# Patient Record
Sex: MALE | Race: WHITE
[De-identification: names, ages, dates, MRNs, and addresses within clinical notes are randomized per-mention and may not be internally consistent; named-entity substitution may affect disease eponyms.]

---

## 2020-04-03 ENCOUNTER — HOSPITAL ENCOUNTER (OUTPATIENT)
Dept: HOSPITAL 56 - MW.ED | Age: 60
LOS: 1 days | Discharge: HOME | End: 2020-04-04
Attending: SURGERY
Payer: SELF-PAY

## 2020-04-03 DIAGNOSIS — F17.210: ICD-10-CM

## 2020-04-03 DIAGNOSIS — K40.31: Primary | ICD-10-CM

## 2020-04-03 LAB
BUN SERPL-MCNC: 15 MG/DL (ref 7–18)
CHLORIDE SERPL-SCNC: 101 MMOL/L (ref 98–107)
CO2 SERPL-SCNC: 25 MMOL/L (ref 21–32)
GLUCOSE SERPL-MCNC: 166 MG/DL (ref 74–106)
LIPASE SERPL-CCNC: 95 U/L (ref 73–393)
POTASSIUM SERPL-SCNC: 3.8 MMOL/L (ref 3.5–5.1)
SODIUM SERPL-SCNC: 139 MMOL/L (ref 136–148)

## 2020-04-03 PROCEDURE — C1781 MESH (IMPLANTABLE): HCPCS

## 2020-04-03 RX ADMIN — HYDROCODONE BITARTRATE AND ACETAMINOPHEN PRN TAB: 5; 325 TABLET ORAL at 20:38

## 2020-04-03 RX ADMIN — HYDROCODONE BITARTRATE AND ACETAMINOPHEN PRN TAB: 5; 325 TABLET ORAL at 15:41

## 2020-04-03 NOTE — PCM.POSTAN
POST ANESTHESIA ASSESSMENT





- MENTAL STATUS


Mental Status: Alert, Oriented





- VITAL SIGNS


Vital Signs: 


 Last Vital Signs











Temp  97.7 F   04/03/20 13:25


 


Pulse  65   04/03/20 13:45


 


Resp  11 L  04/03/20 13:45


 


BP  114/68   04/03/20 13:45


 


Pulse Ox  96   04/03/20 13:45














- RESPIRATORY


Respiratory Status: Respiratory Rate WNL, Airway Patent, O2 Saturation Stable





- CARDIOVASCULAR


CV Status: Pulse Rate WNL, Blood Pressure Stable





- GASTROINTESTINAL


GI Status: No Symptoms





- POST OP HYDRATION


Hydration Status: Adequate & Stable

## 2020-04-03 NOTE — EDM.PDOC
ED HPI GENERAL MEDICAL PROBLEM





- General


Chief Complaint: Abdominal Pain


Stated Complaint: HERNIA


Time Seen by Provider: 04/03/20 08:00


Source of Information: Reports: Patient


History Limitations: Reports: No Limitations





- History of Present Illness


INITIAL COMMENTS - FREE TEXT/NARRATIVE: 





This 59 year old male presents to the ED with a chief complaint of sudden 

severe right inguinal pain that goes into the right mid abdomen since 6:30AM as 

he was on his way to work.  He has a two year history of a right inguinal 

hernia.  He complains of nausea but no vomiting.  He denies any urinary 

symptoms or any other GI symptoms.  He denies any other problems.


Onset: Sudden


Duration: Constant, Getting Worse


Location: Reports: Other (right groin into right scrotal area.)


Quality: Reports: Sharp


Severity: Severe


  ** Right Groin


Pain Score (Numeric/FACES): 10





- Related Data


 Allergies











Allergy/AdvReac Type Severity Reaction Status Date / Time


 


No Known Allergies Allergy   Verified 04/03/20 08:12











Home Meds: 


 Home Meds





. [No Known Home Meds]  04/03/20 [History]











ED ROS GENERAL





- Review of Systems


Review Of Systems: See Below


Constitutional: Reports: No Symptoms


HEENT: Reports: No Symptoms


Respiratory: Reports: No Symptoms


Cardiovascular: Reports: No Symptoms


Endocrine: Reports: No Symptoms


GI/Abdominal: Reports: Abdominal Pain, Nausea, Other (right groin into scrotum)


: Reports: No Symptoms


Musculoskeletal: Reports: No Symptoms


Skin: Reports: No Symptoms


Neurological: Reports: No Symptoms





ED EXAM, GI/ABD





- Physical Exam


Exam: See Below


Exam Limited By: No Limitations


General Appearance: Alert, WD/WN, Moderate Distress (complaining of pain in the 

right groin and right scrotum with associated swelling)


Throat/Mouth: Normal Inspection, Normal Lips, Normal Teeth, Normal Gums, Normal 

Oropharynx, Normal Voice, No Airway Compromise


Neck: Normal Inspection, Supple


Respiratory/Chest: No Respiratory Distress, Lungs Clear, Normal Breath Sounds, 

Chest Non-Tender


Cardiovascular: Normal Peripheral Pulses, Regular Rate, Rhythm, No Edema, No 

Gallop, No JVD, No Murmur


GI/Abdominal Exam: Normal Bowel Sounds, Soft, No Organomegaly, No Distention, 

No Mass, Tender (tenderness in right mid abdomen.), Hernia (right ing hernia 

that appears to be incarcerated extending well into the right scrotal area.  

This hernia is not reducible (incarcerated).)


 (Male) Exam: Circumcised, Scrotum Tenderness (R) (see below), Other (

unreducible hernia is noted in the right scrotal area that elicit severe pain.)

.  No: Penile Lesions


Rectal (Males) Exam: Deferred (at this time)


Back Exam: Normal Inspection


Extremities: Normal Inspection, Normal Range of Motion


Neurological: Alert, Oriented (times 4), CN II-XII Intact, Normal Reflexes


Psychiatric: Normal Affect


Skin Exam: Warm, Dry, Intact, Normal Color, No Rash


Lymphatic: No Adenopathy





Course





- Vital Signs


Text/Narrative:: 





I talked with Dr. Vogel who was about to start surgery at 8:08AM.  I 

informed him of the patient incarcerated right inguinal hernia extending into 

the right scrotum.  He said that he will see the patient as soon as he gets 

finished with his case (in about 1-1.5 hours).





I talked with the Ultrasound Tech at 9:14AM who states that there are loops of 

small bowel seen in the right scrotum and that there is decrease Doppler flow 

to the right testicle.  This might well be compression of the vascular flow due 

to the large inguinal hernia.  I will inform Dr. Vogel of this finding.  His 

CT of the abdomen and pelvis is pending.





Dr. Vogel is in the ED at 9:52AM.  The patient will be going to surgery 

shortly.  The patient agrees with the plan.  The CT scan results are still 

pending.





I asked Dr. Vogel about starting antibiotics.  He stated that he will do so 

in the OR.


Last Recorded V/S: 


 Last Vital Signs











Temp  96.3 F L  04/03/20 08:09


 


Pulse  81   04/03/20 09:00


 


Resp  20   04/03/20 09:00


 


BP  189/90 H  04/03/20 09:00


 


Pulse Ox  98   04/03/20 09:00














- Orders/Labs/Meds


Orders: 


 Active Orders 24 hr











 Category Date Time Status


 


 NPO Now [Nothing per Oral Now Diet] [DIET] Diet  04/03/20 Lunch Active


 


 Abdomen Pelvis w Cont [CT] Stat Exams  04/03/20 08:03 Taken


 


 Scrotal Duplex Ltd [US] Stat Exams  04/03/20 08:23 Taken


 


 Scrotum and Contents [US] Routine Exams  04/03/20 Ordered


 


 LACTIC ACID,WHOLE BLOOD [BG] Stat Lab  04/03/20 09:04 Ordered


 


 UA W/ADAIR RFLX IF INDICATED [URIN] Stat Lab  04/03/20 08:03 Ordered











Labs: 


 Laboratory Tests











  04/03/20 04/03/20 04/03/20 Range/Units





  08:10 08:10 08:10 


 


WBC  9.77    (4.0-11.0)  K/uL


 


RBC  5.30    (4.50-5.90)  M/uL


 


Hgb  17.0    (13.0-17.0)  g/dL


 


Hct  50.6 H    (38.0-50.0)  %


 


MCV  95.5    (80.0-98.0)  fL


 


MCH  32.1 H    (27.0-32.0)  pg


 


MCHC  33.6    (31.0-37.0)  g/dL


 


RDW Std Deviation  47.2    (28.0-62.0)  fl


 


RDW Coeff of Binh  13    (11.0-15.0)  %


 


Plt Count  230    (150-400)  K/uL


 


MPV  11.90    (7.40-12.00)  fL


 


Neut % (Auto)  56.8    (48.0-80.0)  %


 


Lymph % (Auto)  35.0    (16.0-40.0)  %


 


Mono % (Auto)  6.1    (0.0-15.0)  %


 


Eos % (Auto)  1.6    (0.0-7.0)  %


 


Baso % (Auto)  0.5    (0.0-1.5)  %


 


Neut # (Auto)  5.5    (1.4-5.7)  K/uL


 


Lymph # (Auto)  3.4 H    (0.6-2.4)  K/uL


 


Mono # (Auto)  0.6    (0.0-0.8)  K/uL


 


Eos # (Auto)  0.2    (0.0-0.7)  K/uL


 


Baso # (Auto)  0.1    (0.0-0.1)  K/uL


 


Nucleated RBC %  0.0    /100WBC


 


Nucleated RBCs #  0    K/uL


 


INR   0.97   


 


Sodium    139  (136-148)  mmol/L


 


Potassium    3.8  (3.5-5.1)  mmol/L


 


Chloride    101  ()  mmol/L


 


Carbon Dioxide    25.0  (21.0-32.0)  mmol/L


 


BUN    15  (7.0-18.0)  mg/dL


 


Creatinine    1.2  (0.8-1.3)  mg/dL


 


Est Cr Clr Drug Dosing    70.95  mL/min


 


Estimated GFR (MDRD)    > 60.0  ml/min


 


Glucose    166 H  ()  mg/dL


 


Calcium    9.1  (8.5-10.1)  mg/dL


 


Total Bilirubin    0.4  (0.2-1.0)  mg/dL


 


AST    19  (15-37)  IU/L


 


ALT    25  (14-63)  IU/L


 


Alkaline Phosphatase    68  ()  U/L


 


Total Protein    7.8  (6.4-8.2)  g/dL


 


Albumin    4.3  (3.4-5.0)  g/dL


 


Globulin    3.5  (2.6-4.0)  g/dL


 


Albumin/Globulin Ratio    1.2  (0.9-1.6)  


 


Lipase    95  ()  U/L











Meds: 


Medications














Discontinued Medications














Generic Name Dose Route Start Last Admin





  Trade Name Freq  PRN Reason Stop Dose Admin


 


Hydromorphone HCl  1 mg  04/03/20 08:02  04/03/20 08:17





  Dilaudid  IVPUSH  04/03/20 08:03  1 mg





  ONETIME ONE   Administration





     





     





     





     


 


Hydromorphone HCl  0.5 mg  04/03/20 09:02  04/03/20 09:07





  Dilaudid  IVPUSH  04/03/20 09:03  0.5 mg





  ONETIME ONE   Administration





     





     





     





     


 


Sodium Chloride  1,000 mls @ 999 mls/hr  04/03/20 08:04  04/03/20 08:13





  Normal Saline  IV  04/03/20 09:04  999 mls/hr





  .Bolus ONE   Administration





     





     





     





     


 


Iopamidol  100 ml  04/03/20 09:47  04/03/20 09:48





  Isovue Multipack-370 (76%)  IVPUSH  04/03/20 09:48  100 ml





  ONETIME STA   Administration





     





     





     





     


 


Ondansetron HCl  4 mg  04/03/20 08:02  04/03/20 08:15





  Zofran  IVPUSH  04/03/20 08:03  4 mg





  ONETIME ONE   Administration





     





     





     





     














Departure





- Departure


Time of Disposition: 10:08


Disposition: Admitted As Inpatient 64


Condition: Fair


Clinical Impression: 


 Incarcerated right inguinal hernia








- Discharge Information


*PRESCRIPTION DRUG MONITORING PROGRAM REVIEWED*: Yes


*COPY OF PRESCRIPTION DRUG MONITORING REPORT IN PATIENT MAXIMINO: Yes


Referrals: 


PCP,None [Primary Care Provider] - 


Forms:  ED Department Discharge





Sepsis Event Note





- Focused Exam


Vital Signs: 


 Vital Signs











  Temp Pulse Resp BP Pulse Ox


 


 04/03/20 09:00   81  20  189/90 H  98


 


 04/03/20 08:09  96.3 F L  77  26 H  188/70 H  100











Date Exam was Performed: 04/03/20


Time Exam was Performed: 10:04





- My Orders


Last 24 Hours: 


My Active Orders





04/03/20


Scrotum and Contents [US] Routine 





04/03/20 08:03


Abdomen Pelvis w Cont [CT] Stat 


UA W/ADAIR RFLX IF INDICATED [URIN] Stat 





04/03/20 08:23


Scrotal Duplex Ltd [US] Stat 





04/03/20 09:04


LACTIC ACID,WHOLE BLOOD [BG] Stat 





04/03/20 Lunch


NPO Now [Nothing per Oral Now Diet] [DIET] 














- Assessment/Plan


Last 24 Hours: 


My Active Orders





04/03/20


Scrotum and Contents [US] Routine 





04/03/20 08:03


Abdomen Pelvis w Cont [CT] Stat 


UA W/ADAIR RFLX IF INDICATED [URIN] Stat 





04/03/20 08:23


Scrotal Duplex Ltd [US] Stat 





04/03/20 09:04


LACTIC ACID,WHOLE BLOOD [BG] Stat 





04/03/20 Lunch


NPO Now [Nothing per Oral Now Diet] [DIET]
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
## 2020-04-03 NOTE — CR
Chest: Portable view of the chest was obtained.

 

Comparison: No prior chest imaging is available.

 

Slight rib deformity is seen within the right upper chest compatible 

with old healed fractures.  Heart size and mediastinum are normal.  

Lungs show no acute parenchymal change.

 

Impression:

1.  Old bony trauma.

2.  Nothing acute is appreciated on portable chest x-ray.

 

Diagnostic code #2

 

Study was dictated in MDT

## 2020-04-03 NOTE — OR
SURGEON:

Oscar Vogel M.D.

 

DATE OF PROCEDURE:  04/03/2020

 

OPERATION PERFORMED:

Repair of recurrent incarcerated right inguinal hernia with small Bard PerFix

plug-and-patch.

 

PRIMARY SURGEON:

Oscar Vogel MD

 

ASSISTANT:

 Assist:  Lynda Worrell.

 

ANESTHESIA:

Spinal.

 

ASA CLASSIFICATION:

IIE.

 

PREOPERATIVE DIAGNOSIS:

Recurrent incarcerated right inguinal hernia.

 

POSTOPERATIVE DIAGNOSIS:

Recurrent incarcerated right inguinal hernia.

 

ESTIMATED BLOOD LOSS:

50 mL.

 

INTRAOPERATIVE FLUID REPLACEMENT:

1200 mL of crystalloid.

 

DESCRIPTION OF PROCEDURE:

The patient was taken to the operating room and placed on the operating table in

the supine position.  Time-out was called for appropriate identification of the

patient and procedure.  Thigh-high TEDs and sequential compression boots were

placed.  Following satisfactory attainment of spinal anesthesia, the patient was

placed supine on the operating table.  The surgical site was then prepped with

DuraPrep solution.  Sterile drapes were applied.  The skin overlying the right

inguinal crease was infiltrated with 10 mL of 0.5% Marcaine solution.  A skin

incision was made and deepened through the subcutaneous tissue obtaining

hemostasis with the use of electrocautery.  Dissection was carried down to the

external oblique, which was quite tight.  This was incised and at that point the

hernia sac was much softer.  It did take a great deal of blunt dissection to

mobilize the hernia sac.  Bleeding sites were electrocoagulated.  Once the

hernia sac had been mobilized and the contents reduced, the hernia sac was

opened.  There was a small amount of serous fluid present.  There was no

malodorous drainage.  Small bowel was able to be examined through the sac and

was viable.  It was healthy in color.  There was no duskiness to the small bowel

itself.  This was then reduced and a pursestring suture of 0 silk was placed at

the neck of the hernia sac.  Once that was tied, the excess hernia sac was

removed with the Harmonic scalpel.  This was then reduced and a small Bard

PerFix plug-and-patch was brought to the operating table.  This was soaked in 1%

Ancef solution.  The plug was placed into the internal ring and secured with 0

Ethibond sutures.  The patch was placed over the weakened floor and secured

medially and inferiorly to Rajiv ligament transitioning to the inguinal

ligament and superiorly to transversalis fascia, again using multiple

interrupted 0 Ethibond sutures.  All sutures were placed under direct vision and

held with hemostats until the final suture had been placed.  The wings were

brought around the cord and secured laterally.  All sutures were then tied down.

Wound was inspected for hemostasis and small bleeding sites were

electrocoagulated.  The incision was irrigated with 1% Ancef solution and all

fluid was aspirated.  The patient was sedated despite his spinal anesthetic and

was not able to cough.  The external oblique fascia was reapproximated with 3-0

Vicryl.  Kimberly fascia was closed with 3-0 Vicryl.  Skin edges were

reapproximated with subcuticular 4-0 Monocryl, reinforced with half-inch Steri-

Strips.  Sterile Tegaderm pad was placed as a dressing.

 

Sponge, needle, and instrument counts were all correct.  The patient tolerated

the procedure well.  He was taken to recovery room in stable condition.

 

 

DANNY WHITE

DD:  04/03/2020 13:47:07

DT:  04/03/2020 16:47:52

Job #:  [id number]

## 2020-04-03 NOTE — PCM.OPNOTE
- General Post-Op/Procedure Note


Date of Surgery/Procedure: 04/03/20


Operative Procedure(s): Repair recurrent incarcerated right inguinal hernia 

with small Bard PerFix plug and patch


Pre Op Diagnosis: Recurrent incarcerated right inguinal hernia


Post-Op Diagnosis: Same


Anesthesia Technique: Spinal (ASA IIE)


Primary Surgeon: Oscar Vogel


Assistant: Zuleyka Worrell


Reason Assistant Was Necessary: 





Exposure


Condition: Good


Free Text/Narrative:: 





DICTATION [id number]





CPT CODE 07951

## 2020-04-03 NOTE — PCM.PREANE
Preanesthetic Assessment





- Anesthesia/Transfusion/Family Hx


Anesthesia History: Prior Anesthesia Without Reaction


Family History of Anesthesia Reaction: No





- Review of Systems


General: No Symptoms


Pulmonary: No Symptoms


Cardiovascular: No Symptoms


Gastrointestinal: Abdominal Pain


Neurological: No Symptoms


Other: Reports: None





- Physical Assessment


NPO Status Date: 04/02/20


Vital Signs: 





 Last Vital Signs











Temp  96.3 F L  04/03/20 08:09


 


Pulse  74   04/03/20 10:00


 


Resp  18   04/03/20 10:00


 


BP  174/104 H  04/03/20 10:00


 


Pulse Ox  97   04/03/20 10:00











Height: 6 ft


Weight: 81.647 kg


ASA Class: 2E


Mental Status: Alert & Oriented x3


Airway Class: Mallampati = 2


Dentition: Reports: Dentures (full upper)


ROM/Head Extension: Full


Lungs: Clear to Auscultation, Normal Respiratory Effort


Cardiovascular: Regular Rate, Regular Rhythm





- Lab


Values: 





 Laboratory Last Values











WBC  9.77 K/uL (4.0-11.0)   04/03/20  08:10    


 


RBC  5.30 M/uL (4.50-5.90)   04/03/20  08:10    


 


Hgb  17.0 g/dL (13.0-17.0)   04/03/20  08:10    


 


Hct  50.6 % (38.0-50.0)  H  04/03/20  08:10    


 


MCV  95.5 fL (80.0-98.0)   04/03/20  08:10    


 


MCH  32.1 pg (27.0-32.0)  H  04/03/20  08:10    


 


MCHC  33.6 g/dL (31.0-37.0)   04/03/20  08:10    


 


RDW Std Deviation  47.2 fl (28.0-62.0)   04/03/20  08:10    


 


RDW Coeff of Binh  13 % (11.0-15.0)   04/03/20  08:10    


 


Plt Count  230 K/uL (150-400)   04/03/20  08:10    


 


MPV  11.90 fL (7.40-12.00)   04/03/20  08:10    


 


Neut % (Auto)  56.8 % (48.0-80.0)   04/03/20  08:10    


 


Lymph % (Auto)  35.0 % (16.0-40.0)   04/03/20  08:10    


 


Mono % (Auto)  6.1 % (0.0-15.0)   04/03/20  08:10    


 


Eos % (Auto)  1.6 % (0.0-7.0)   04/03/20  08:10    


 


Baso % (Auto)  0.5 % (0.0-1.5)   04/03/20  08:10    


 


Neut # (Auto)  5.5 K/uL (1.4-5.7)   04/03/20  08:10    


 


Lymph # (Auto)  3.4 K/uL (0.6-2.4)  H  04/03/20  08:10    


 


Mono # (Auto)  0.6 K/uL (0.0-0.8)   04/03/20  08:10    


 


Eos # (Auto)  0.2 K/uL (0.0-0.7)   04/03/20  08:10    


 


Baso # (Auto)  0.1 K/uL (0.0-0.1)   04/03/20  08:10    


 


Nucleated RBC %  0.0 /100WBC  04/03/20  08:10    


 


Nucleated RBCs #  0 K/uL  04/03/20  08:10    


 


INR  0.97   04/03/20  08:10    


 


Lactate  1.8 mmol/L (0.20-2.00)   04/03/20  10:00    


 


Sodium  139 mmol/L (136-148)   04/03/20  08:10    


 


Potassium  3.8 mmol/L (3.5-5.1)   04/03/20  08:10    


 


Chloride  101 mmol/L ()   04/03/20  08:10    


 


Carbon Dioxide  25.0 mmol/L (21.0-32.0)   04/03/20  08:10    


 


BUN  15 mg/dL (7.0-18.0)   04/03/20  08:10    


 


Creatinine  1.2 mg/dL (0.8-1.3)   04/03/20  08:10    


 


Est Cr Clr Drug Dosing  70.95 mL/min  04/03/20  08:10    


 


Estimated GFR (MDRD)  > 60.0 ml/min  04/03/20  08:10    


 


Glucose  166 mg/dL ()  H  04/03/20  08:10    


 


Calcium  9.1 mg/dL (8.5-10.1)   04/03/20  08:10    


 


Total Bilirubin  0.4 mg/dL (0.2-1.0)   04/03/20  08:10    


 


AST  19 IU/L (15-37)   04/03/20  08:10    


 


ALT  25 IU/L (14-63)   04/03/20  08:10    


 


Alkaline Phosphatase  68 U/L ()   04/03/20  08:10    


 


Total Protein  7.8 g/dL (6.4-8.2)   04/03/20  08:10    


 


Albumin  4.3 g/dL (3.4-5.0)   04/03/20  08:10    


 


Globulin  3.5 g/dL (2.6-4.0)   04/03/20  08:10    


 


Albumin/Globulin Ratio  1.2  (0.9-1.6)   04/03/20  08:10    


 


Lipase  95 U/L ()   04/03/20  08:10    














- Allergies


Allergies/Adverse Reactions: 


 Allergies











Allergy/AdvReac Type Severity Reaction Status Date / Time


 


No Known Allergies Allergy   Verified 04/03/20 08:12














- Blood


Blood Available: No





- Anesthesia Plan


Pre-Op Medication Ordered: None





- Acknowledgements


Anesthesia Type Planned: Spinal


Pt an Appropriate Candidate for the Planned Anesthesia: Yes


Alternatives and Risks of Anesthesia Discussed w Pt/Guardian: Yes


Pt/Guardian Understands and Agrees with Anesthesia Plan: Yes


Additional Comments: 





PMH: s/p lumbar lami, 40 yr smoking hx, sip coffee 6am


PLAN: spinal with sedation, intrathecal duramorph





PreAnesthesia Questionnaire


Neurological History: Reports: None





- Past Surgical History


Neurological Surgical History: Reports: Other (See Below)


Other Neurological Surgeries/Procedures: Pt reports "neck surgery"


Musculoskeletal Surgical History: Reports: Arthroscopic Knee





- SUBSTANCE USE


Smoking Status *Q: Current Every Day Smoker


Tobacco Use Within Last Twelve Months: Cigarettes


Recreational Drug Use History: No





- HOME MEDS


Home Medications: 


 Home Meds





. [No Known Home Meds]  04/03/20 [History]











- CURRENT (IN HOUSE) MEDS


Current Meds: 





 Current Medications





Lactated Ringer's (Ringers, Lactated)  1,000 mls @ 125 mls/hr IV ASDIRECTED OFELIA


   Last Admin: 04/03/20 10:20 Dose:  125 mls/hr


Cefazolin Sodium/Dextrose 2 gm (/ Premix)  50 mls @ 100 mls/hr IV ONETIME OFELIA





Discontinued Medications





Bupivacaine HCl (Marcaine 0.5%) Confirm Administered Dose 120 ml .ROUTE .STK-

MED ONE


   Stop: 04/03/20 10:20


Cefazolin Sodium (Ancef) Confirm Administered Dose 1 gm .ROUTE .STK-MED ONE


   Stop: 04/03/20 10:19


Hydromorphone HCl (Dilaudid)  1 mg IVPUSH ONETIME ONE


   Stop: 04/03/20 08:03


   Last Admin: 04/03/20 08:17 Dose:  1 mg


Hydromorphone HCl (Dilaudid)  0.5 mg IVPUSH ONETIME ONE


   Stop: 04/03/20 09:03


   Last Admin: 04/03/20 09:07 Dose:  0.5 mg


Hydromorphone HCl (Dilaudid)  0.5 mg IVPUSH ONETIME ONE


   Stop: 04/03/20 10:11


   Last Admin: 04/03/20 10:20 Dose:  0.5 mg


Sodium Chloride (Normal Saline)  1,000 mls @ 999 mls/hr IV .Bolus ONE


   Stop: 04/03/20 09:04


   Last Admin: 04/03/20 08:13 Dose:  999 mls/hr


Iopamidol (Isovue Multipack-370 (76%))  100 ml IVPUSH ONETIME STA


   Stop: 04/03/20 09:48


   Last Admin: 04/03/20 09:48 Dose:  100 ml


Morphine Sulfate (Duramorph Pf) Confirm Administered Dose 10 mg .ROUTE .STK-MED 

ONE


   Stop: 04/03/20 10:35


Ondansetron HCl (Zofran)  4 mg IVPUSH ONETIME ONE


   Stop: 04/03/20 08:03


   Last Admin: 04/03/20 08:15 Dose:  4 mg

## 2020-04-03 NOTE — US
Testicular ultrasound: Multiple real-time images were obtained.



Comparison: Subsequent CT abdomen and pelvis study.



Right inguinal hernia is noted containing small bowel loops.  Calcification is 
noted within the left testicle believed to be benign since this is the only 
calcification present.  Slight mass-effect is seen upon the right testicle due 
to the right inguinal hernia.  No definite blood flow within the right testicle 
is seen suggesting vascular compromise from the hernia.



Measurements:

Right testicle: 4.5 x 2.9 x 3.9 cm

Left testicle: 4.8 x 2.6 x 4.2 cm



Impression:

1.  Right inguinal hernia containing small bowel loops.  This hernia appears to 
cost some vascular compromise upon the right testicle with no significant blood 
flow being seen within the right testicle.

2.  Calcification within the left testicle believed to be benign since this is 
the only calcification present.



Diagnostic code #5



This report was dictated in MDT
MTDD
Testicular ultrasound: Multiple real-time images were obtained.

 

Comparison: Subsequent CT abdomen and pelvis study.

 

Right inguinal hernia is noted containing small bowel loops.  

Calcification is noted within the left testicle believed to be benign 

since this is the only calcification present.  Slight mass-effect is 

seen upon the right testicle due to the right inguinal hernia.  No 

definite blood flow within the right testicle is seen suggesting 

vascular compromise from the hernia.

 

Measurements:

Right testicle: 4.5 x 2.9 x 3.9 cm

Left testicle: 4.8 x 2.6 x 4.2 cm

 

Impression:

1.  Right inguinal hernia containing small bowel loops.  This hernia 

appears to cost some vascular compromise upon the right testicle with 

no significant blood flow being seen within the right testicle.

2.  Calcification within the left testicle believed to be benign since

 this is the only calcification present.

 

Diagnostic code #5

 

This report was dictated in MDT
weight-bearing as tolerated

## 2020-04-03 NOTE — CT
CT abdomen and pelvis

 

Technique: Multiple axial sections were obtained from above the dome 

of the diaphragm inferiorly through the pubic symphysis.  Intravenous 

contrast was utilized.  No oral contrast has been given.

 

Comparison: No prior abdominal or pelvic CT exam is available.

 

Findings: Visualized lung bases show nothing acute.  Moderately large 

hiatal hernia is noted.  Adrenal glands show no nodule.  Gallbladder 

contains no calcified gallstones.  Kidneys show symmetric contrast 

enhancement without hydronephrosis or mass.  Pancreas is within normal

 limits.  Aorta shows atherosclerotic change without aneurysm.  No 

retroperitoneal adenopathy or mesenteric abnormalities are seen.  No 

pelvic mass or adenopathy is noted.  Right inguinal hernia is noted.  

Hernia contains loops of small bowel.  No dilatation of the distal 

small bowel is seen.  Slightly dilated small bowel is noted within the

 hernia.  Small fat-containing left inguinal hernia is also noted.

 

Appendix not visualized with certainty.  No free fluid or inflammatory

 change is appreciated.

 

Bone window settings were reviewed.  No acute osseous finding is seen.

  Minimal degenerative change noted within the spine.

 

Impression:

1.  Right inguinal hernia containing small bowel loops.  Small bowel 

within the hernia is slightly prominent in size although no dilatation

 is seen of more distal small bowel.

2.  Small fat-containing left inguinal hernia.

3.  Moderate sized hiatal hernia. 

 

Diagnostic code #3

 

This report was dictated in MDT

## 2020-04-04 RX ADMIN — HYDROCODONE BITARTRATE AND ACETAMINOPHEN PRN TAB: 5; 325 TABLET ORAL at 09:52

## 2020-04-04 RX ADMIN — HYDROCODONE BITARTRATE AND ACETAMINOPHEN PRN TAB: 5; 325 TABLET ORAL at 01:50

## 2020-04-04 RX ADMIN — HYDROCODONE BITARTRATE AND ACETAMINOPHEN PRN TAB: 5; 325 TABLET ORAL at 05:50

## 2020-04-04 NOTE — [id number]
Post Anesthesia Note





- EVALUATION WITHIN 48HRS OF ANESTHETIC


Vital Signs in Normal Range: Yes


Patient Participated in Evaluation: Yes


Respiratory Function Stable: Yes


Airway Patent: Yes


Cardiovascular Function Stable: Yes


Hydration Status Stable: Yes


Pain Control Satisfactory: Yes


Nausea and Vomiting Control Satisfactory: Yes


Mental Status Recovered: Yes


Vital Signs: 


 Last Vital Signs











Temp  36.6 C   04/04/20 07:55


 


Pulse  63   04/04/20 07:55


 


Resp  16   04/04/20 07:55


 


BP  134/70   04/04/20 07:55


 


Pulse Ox  95   04/04/20 07:55














- COMMENTS/OBSERVATIONS


Free Text/Narrative:: 


No anesthesia complications noted. No anesthesia concerns.